# Patient Record
Sex: MALE | Race: OTHER
[De-identification: names, ages, dates, MRNs, and addresses within clinical notes are randomized per-mention and may not be internally consistent; named-entity substitution may affect disease eponyms.]

---

## 2020-09-22 LAB
ADD MANUAL DIFF: NO
ANION GAP SERPL CALC-SCNC: 3 MMOL/L (ref 5–15)
BASOPHILS NFR BLD AUTO: 1.6 % (ref 0–2)
BUN SERPL-MCNC: 22 MG/DL (ref 7–18)
CALCIUM SERPL-MCNC: 9.5 MG/DL (ref 8.5–10.1)
CHLORIDE SERPL-SCNC: 103 MMOL/L (ref 98–107)
CO2 SERPL-SCNC: 33 MMOL/L (ref 21–32)
CREAT SERPL-MCNC: 1.1 MG/DL (ref 0.55–1.3)
EOSINOPHIL NFR BLD AUTO: 8 % (ref 0–3)
ERYTHROCYTE [DISTWIDTH] IN BLOOD BY AUTOMATED COUNT: 11.7 % (ref 11.6–14.8)
HCT VFR BLD CALC: 46.3 % (ref 42–52)
HGB BLD-MCNC: 15.5 G/DL (ref 14.2–18)
INR PPP: 0.9 (ref 0.9–1.1)
LYMPHOCYTES NFR BLD AUTO: 32.6 % (ref 20–45)
MCV RBC AUTO: 90 FL (ref 80–99)
MONOCYTES NFR BLD AUTO: 9.2 % (ref 1–10)
NEUTROPHILS NFR BLD AUTO: 48.7 % (ref 45–75)
PLATELET # BLD: 223 K/UL (ref 150–450)
POTASSIUM SERPL-SCNC: 4.7 MMOL/L (ref 3.5–5.1)
RBC # BLD AUTO: 5.13 M/UL (ref 4.7–6.1)
SODIUM SERPL-SCNC: 139 MMOL/L (ref 136–145)
WBC # BLD AUTO: 7.3 K/UL (ref 4.8–10.8)

## 2020-09-22 NOTE — CARDIOLOGY REPORT
--------------- APPROVED REPORT --------------





EKG Measurement

Heart [id number]

GA [id number]

[id number]

[id number]

[id number]



<Conclusion>

Sinus bradycardia

Rightward axis

Borderline ECG

## 2020-09-23 ENCOUNTER — HOSPITAL ENCOUNTER (OUTPATIENT)
Dept: HOSPITAL 72 - SUR | Age: 55
Discharge: HOME | End: 2020-09-23
Payer: COMMERCIAL

## 2020-09-23 VITALS — DIASTOLIC BLOOD PRESSURE: 72 MMHG | SYSTOLIC BLOOD PRESSURE: 121 MMHG

## 2020-09-23 VITALS — DIASTOLIC BLOOD PRESSURE: 70 MMHG | SYSTOLIC BLOOD PRESSURE: 127 MMHG

## 2020-09-23 VITALS — SYSTOLIC BLOOD PRESSURE: 107 MMHG | DIASTOLIC BLOOD PRESSURE: 58 MMHG

## 2020-09-23 VITALS — SYSTOLIC BLOOD PRESSURE: 129 MMHG | DIASTOLIC BLOOD PRESSURE: 78 MMHG

## 2020-09-23 VITALS — DIASTOLIC BLOOD PRESSURE: 75 MMHG | SYSTOLIC BLOOD PRESSURE: 124 MMHG

## 2020-09-23 VITALS — DIASTOLIC BLOOD PRESSURE: 75 MMHG | SYSTOLIC BLOOD PRESSURE: 130 MMHG

## 2020-09-23 VITALS — HEIGHT: 74 IN | WEIGHT: 188 LBS | BODY MASS INDEX: 24.13 KG/M2

## 2020-09-23 VITALS — SYSTOLIC BLOOD PRESSURE: 124 MMHG | DIASTOLIC BLOOD PRESSURE: 87 MMHG

## 2020-09-23 VITALS — SYSTOLIC BLOOD PRESSURE: 123 MMHG | DIASTOLIC BLOOD PRESSURE: 73 MMHG

## 2020-09-23 VITALS — DIASTOLIC BLOOD PRESSURE: 73 MMHG | SYSTOLIC BLOOD PRESSURE: 127 MMHG

## 2020-09-23 DIAGNOSIS — F17.200: ICD-10-CM

## 2020-09-23 DIAGNOSIS — R00.1: ICD-10-CM

## 2020-09-23 DIAGNOSIS — D72.1: ICD-10-CM

## 2020-09-23 DIAGNOSIS — Z90.89: ICD-10-CM

## 2020-09-23 DIAGNOSIS — Z82.49: ICD-10-CM

## 2020-09-23 DIAGNOSIS — N43.41: Primary | ICD-10-CM

## 2020-09-23 PROCEDURE — 54840 REMOVE EPIDIDYMIS LESION: CPT

## 2020-09-23 PROCEDURE — 93005 ELECTROCARDIOGRAM TRACING: CPT

## 2020-09-23 PROCEDURE — 85730 THROMBOPLASTIN TIME PARTIAL: CPT

## 2020-09-23 PROCEDURE — 71045 X-RAY EXAM CHEST 1 VIEW: CPT

## 2020-09-23 PROCEDURE — 94003 VENT MGMT INPAT SUBQ DAY: CPT

## 2020-09-23 PROCEDURE — 36415 COLL VENOUS BLD VENIPUNCTURE: CPT

## 2020-09-23 PROCEDURE — 94150 VITAL CAPACITY TEST: CPT

## 2020-09-23 PROCEDURE — 85025 COMPLETE CBC W/AUTO DIFF WBC: CPT

## 2020-09-23 PROCEDURE — 80048 BASIC METABOLIC PNL TOTAL CA: CPT

## 2020-09-23 PROCEDURE — 85610 PROTHROMBIN TIME: CPT

## 2020-09-23 NOTE — BRIEF OPERATIVE NOTE
Immediate Post Operative Note


Operative Note


Pre-op Diagnosis:


left spermatocele


Procedure:


left spermatocelectomy


Post-op Diagnosis:


same


Post-op Diagnosis:  same as pre-op


Surgeon:  molly chatterjee


Specimen:  yes


Complications:  none


Condition:  stable


Fluids:  500


Estimated Blood Loss:  minimal


Implant(s) used?:  No











Mathew Chatterjee MD          Sep 23, 2020 12:00

## 2020-09-23 NOTE — ANETHESIA PREOPERATIVE EVAL
Anesthesia Pre-op PMH/ROS


General


Date of Evaluation:  Sep 23, 2020


Time of Evaluation:  11:45


Anesthesiologist:  kelley


ASA Score:  ASA 2


Mallampati Score


Class I : Soft palate, uvula, fauces, pillars visible


Class II: Soft palate, uvula, fauces visible


Class III: Soft palate, base of uvula visible


Class IV: Only hard plate visible


Mallampati Classification:  Class II


Surgeon:  Sen


Diagnosis:  spermatocele


Surgical Procedure:  [place]


Anesthesia History:  none


Social History:  smoking, alcohol use


Family History:  no anesthesia problems


Allergies:  


Coded Allergies:  


     No Known Allergies (Unverified , 9/22/20)


Medications:  see eMAR


Patient NPO?:  Yes


NPO Date:  Sep 23, 2020


NPO Time:  00:01





Past Medical History


Cardiovascular:  Denies: HTN, CAD, MI, valve dz, arrhythmia, other


Pulmonary:  Denies: asthma, COPD, LUANN, other


Gastrointestinal/Genitourinary:  Denies: GERD, CRI, ESRD, other


Neurologic/Psychiatric:  Denies: dementia, CVA, depression/anxiety, TIA, other


Endocrine:  Denies: DM, hypothyroidism, steroids, other


HEENT:  Denies: cataract (L), cataract (R), glaucoma, [place] (L), [place] (R), other


Hematology/Immune:  Denies: anemia, DVT, bleeding disorder, other


Musculoskeletal/Integumentary:  Denies: OA, RA, DJD, DDD, edema, other


PSxH Narrative:


appendectmy





Anesthesia Pre-op Phys. Exam


Physician Exam





Last Vital Signs








  Date Time  Temp Pulse Resp B/P (MAP) Pulse Ox O2 Delivery O2 Flow Rate FiO2


 


9/23/20 10:12      Room Air  


 


9/23/20 09:57 96.6 44 18 129/78 99   








Constitutional:  NAD


Neurologic:  CN 2-12 intact


Cardiovascular:  RRR


Respiratory:  CTA


Gastrointestinal:  S/NT/ND





Airway Exam


Mallampati Classification


2


Mallampati Score:  Class II


MO:  full


ROM:  full


Teeth:  missing - right front


Dentures:  no upper, no lower





Anesthesia Pre-op A/P


Studies


Pre-op Studies:  EKG - SR





Risk Assessment & Plan


Assessment:


covid neg


Plan:


General


Status Change Before Surgery:  No





Pre-Antibiotics


Drug:  ancef


Given Within 1 Hr of Incision:  Yes


Time Given:  12:00











Rachna Nolan CRNA     Sep 23, 2020 13:00

## 2020-09-23 NOTE — 48 HOUR POST ANESTHESIA EVAL
Post Anesthesia Evaluation


Procedure:  Left Spermatocelectomy


Date of Evaluation:  Sep 23, 2020


Time of Evaluation:  14:48


Blood Pressure Systolic:  127


0:  70


Pulse Rate:  65


Respiratory Rate:  14


O2 Sat by Pulse Oximetry:  98


Airway:  patent


Nausea:  No


Vomiting:  No


Hydration Status:  adequate


Cardiopulmonary Status:


stable


Mental Status/LOC:  patient returned to baseline


Post-Anesthesia Complications:


none


Follow-up care needed:  N/A











Rachna Nolan CRNA     Sep 23, 2020 14:48

## 2020-09-23 NOTE — PRE-OP HX & PHY REPO 2 SIG
DATE OF ADMISSION:  2020

PRESURGICAL INTERNAL MEDICINE HISTORY AND PHYSICAL



DATE OF EVALUATION:  2020.



REASON FOR EVALUATION:  I was asked by Dr. Mathew Chatterjee to see this

54-year-old male, who is going for elective surgery on the left testicle.

The patient has left spermatocele of left testicle.  See full History and

Physical by urologist, Dr. Mathew Chatterjee.  The patient was evaluated

in the operative department of [facility].



PAST MEDICAL HISTORY:  Denies history of hypertension.  No chest pain.  No

palpitation.  No heart attack.  No respiratory problem, asthma, or

bronchitis.  No diabetes.  Denies history of GI problem.  Denies history

of renal failure.  No anemia.  No thyroid problem.



SURGICAL HISTORY:  Appendectomy and kidney biopsy.



FAMILY HISTORY:  Mother  from complication of breast cancer and father

 at age of 71 from heart attack.



ALLERGIES:  Not known.



PRESENT MEDICATIONS:  Occasionally vitamin supplements.



HABITS:  The patient is a vaper tobacco, wine up to two glasses daily and

occasionally marijuana.



PHYSICAL EXAMINATION:

GENERAL:  The patient is alert, well-developed and well-nourished male in

his 50s.  No acute distress.

VITAL SIGNS:  Blood pressure 129/78, temperature 96.6, pulse 44,

respirations 18, O2 saturation 99% on room air.

SKIN:  On the right shin above ankle approximately 1 cm diameter _____ scab

approximately one month old.  The patient's skin is warm, dry.  No

pigmentation.  No jaundice.

LYMPHATICS:  Lymph nodes not enlarged.

HEENT:  Head, normocephalic, atraumatic.  Ears, clear.  Eyes, _____ muscles

intact.  No jaundice or conjunctivitis.  Mouth, clear and moist.  No

dentures.

NECK:  Supple.  No jugular venous distention.  Carotids artery +2.  Trachea

midline.

CHEST:  No deformity or asymmetry.

LUNGS:  Clear to auscultation and percussion.

HEART:  Sinus bradycardia.  No murmur.  No S3, S4.

ABDOMEN:  Soft, benign.  No palpable mass.  No rebound.

EXTREMITIES:  No edema.  No varices.  No deformities.

GENITOURINARY TRACT:  See Dr. Mathew Chatterjee's note.  No CVA

tenderness.

NERVOUS SYSTEM:  No tremor.  No nystagmus.  No asymmetry.



DIAGNOSTIC DATA:  Electrocardiogram, sinus bradycardia 48 per minute,

rightward axis.  The patient is NPO from 9 p.m. yesterday.



LABORATORY DATA:  White blood cells 7.3, hemoglobin 15.5, hematocrit 46.3,

eosinophil 8.0.  PT 10.3, INR 0.9.  Sodium 139, potassium 4.7, BUN 22,

creatinine 1.0, estimated GFR more than 60, glucose 98 mg/dL, calcium 9.5.

 



IMPRESSION:

1. Left spermatocele of testicle.

2. Sinus bradycardia, asymptomatic.

3. Eosinophilia, unexplained.



PLAN:  Spermatocelectomy, left testicle per Dr. Mathew Chatterjee.



CONCLUSION:  The patient is a 54-year-old male with no previous risk factor

of lung, diabetes.  The patient in sinus bradycardia asymptomatic and

vital signs stable.



ECG, sinus bradycardia with rightward axis.  The patient is NPO.  The

patient's condition optimized for surgery.



Thank you very much, Dr. Chatterjee, for privilege to participate in

presurgical care of this interesting patient.









  ______________________________________________

  Yong Yee M.D.





DR:  YOSSI

D:  2020 12:00

T:  2020 01:05

JOB#:  [id number]

CC:

## 2020-09-23 NOTE — IMMEDIATE POST-OP EVALUATION
Immediate Post-Op Evalulation


Immediate Post-Op Evalulation


Procedure:  Left Spermatocelectomy


Date of Evaluation:  Sep 23, 2020


Time of Evaluation:  13:18


IV Fluids:  1000


Estimated Blood Loss:  5


Blood Pressure Systolic:  133


Blood Pressure Diastolic:  70


Pulse Rate:  70


Respiratory Rate:  14


O2 Sat by Pulse Oximetry:  99


Temperature (Fahrenheit):  97.3


Nausea:  No


Vomiting:  No


Complications


none


Patient Status:  awake, reacts, patent


Hydration Status:  adequate


Drug:  ancef


Given Within 1 Hr of Incision:  Yes


Time Given:  12:05











Rachna Nolan CRNA     Sep 23, 2020 13:18

## 2020-09-23 NOTE — PRE-PROCEDURE NOTE/ATTESTATION
Pre-Procedure Note/Attestation


Complete Prior to Procedure


Planned Procedure:  left


Procedure Narrative:


left spermatocelectomy





Indications for Procedure


Pre-Operative Diagnosis:


left spermatocele





Attestation


I attest that I discussed the nature of the procedure; its benefits; risks and 

complications; and alternatives (and the risks and benefits of such 

alternatives), prior to the procedure, with the patient (or the patient's legal 

representative).





I attest that, if there was a reasonable possibility of needing a blood 

transfusion, the patient (or the patient's legal representative) was given the 

[facility] standardized written summary, pursuant 

to the [place] Blood Safety Act ([place] Health and Safety Code # 1645, as 

amended).





I attest that I re-evaluated the patient just prior to the surgery and that 

there has been no change in the patient's H&P, except as documented below:











Mathew Chatterjee MD          Sep 23, 2020 11:58

## 2020-09-29 NOTE — OPERATIVE NOTE - DICTATED
DATE OF OPERATION:  09/23/2020

PREOPERATIVE DIAGNOSIS:  Left epididymal cyst versus spermatocele.



POSTOPERATIVE DIAGNOSIS:  Left epididymal cyst versus spermatocele.



OPERATION:  Left spermatocelectomy.



:  Mathew Chatterjee MD



ANESTHESIA:  General.



FINDINGS:  Large left spermatocele.



INDICATIONS FOR SURGERY:  Patient had above findings for a long period of

time with significant scrotal disfigurement.  Treatment option were

explained to him in great length including all potential complications.

He signed a consent.



DESCRIPTION OF SURGERY:  He was brought to the operating room, placed in

supine position.  Prepped and draped in a standard fashion.  Under general

anesthesia, a midline scrotal incision was made and the left testicle and

spermatocele was mobilized.  After that, using sharp and blunt dissection,

spermatocele was resected.  _________ tied and sent for pathologic

examination.  All small supporting vessels were ligated.  Hemostasis was

completed and the wound was closed in 3 layers.  Subcuticular closure for

the skin.  Patient tolerated the procedure well.  No evidence of

complications.









  ______________________________________________

  Mathew Chatterjee M.D.





DR:  GABBY

D:  09/29/2020 14:14

T:  09/29/2020 17:50

JOB#:  [id number]

CC: